# Patient Record
Sex: FEMALE | Race: WHITE | NOT HISPANIC OR LATINO | URBAN - NONMETROPOLITAN AREA
[De-identification: names, ages, dates, MRNs, and addresses within clinical notes are randomized per-mention and may not be internally consistent; named-entity substitution may affect disease eponyms.]

---

## 2018-02-26 PROBLEM — H52.13: Noted: 2018-10-12

## 2018-02-26 PROBLEM — Z46.0: Noted: 2018-02-26

## 2019-12-31 ENCOUNTER — IMPORTED ENCOUNTER (OUTPATIENT)
Dept: URBAN - NONMETROPOLITAN AREA CLINIC 1 | Facility: CLINIC | Age: 44
End: 2019-12-31

## 2019-12-31 PROCEDURE — V2520 CONTACT LENS HYDROPHILIC: HCPCS

## 2019-12-31 PROCEDURE — 92014 COMPRE OPH EXAM EST PT 1/>: CPT

## 2019-12-31 PROCEDURE — 92015 DETERMINE REFRACTIVE STATE: CPT

## 2019-12-31 PROCEDURE — 92310 CONTACT LENS FITTING OU: CPT

## 2019-12-31 NOTE — PATIENT DISCUSSION
Myopia  OU - Discussed diagnosis in detail with patient- New Glasses and CL RX given today- Continue to monitor- RTC 1 year complete; 's Notes: Just moved  no doctor

## 2021-08-18 NOTE — PATIENT DISCUSSION
Continue current management. low IOP, continue seeing glaucoma specialist every 3 months . Has visual field loss which will affect reading vision. Suggested single vision near eyeglasses in addition to progressives.

## 2022-04-15 ASSESSMENT — TONOMETRY
OD_IOP_MMHG: 14
OS_IOP_MMHG: 14

## 2022-04-15 ASSESSMENT — VISUAL ACUITY
OD_SC: 20/20
OS_SC: 20/20